# Patient Record
Sex: MALE | Race: OTHER | NOT HISPANIC OR LATINO | URBAN - METROPOLITAN AREA
[De-identification: names, ages, dates, MRNs, and addresses within clinical notes are randomized per-mention and may not be internally consistent; named-entity substitution may affect disease eponyms.]

---

## 2019-04-16 ENCOUNTER — INPATIENT (INPATIENT)
Facility: HOSPITAL | Age: 29
LOS: 1 days | Discharge: HOME | DRG: 917 | End: 2019-04-18
Payer: SELF-PAY

## 2019-04-16 VITALS — SYSTOLIC BLOOD PRESSURE: 104 MMHG

## 2019-04-16 LAB — PCP SPEC-MCNC: SIGNIFICANT CHANGE UP

## 2019-04-16 PROCEDURE — 93010 ELECTROCARDIOGRAM REPORT: CPT

## 2019-04-16 PROCEDURE — 99291 CRITICAL CARE FIRST HOUR: CPT

## 2019-04-16 PROCEDURE — 70450 CT HEAD/BRAIN W/O DYE: CPT | Mod: 26

## 2019-04-16 PROCEDURE — 99232 SBSQ HOSP IP/OBS MODERATE 35: CPT | Mod: GC

## 2019-04-16 PROCEDURE — 71045 X-RAY EXAM CHEST 1 VIEW: CPT | Mod: 26

## 2019-04-16 RX ORDER — SODIUM CHLORIDE 9 MG/ML
1000 INJECTION INTRAMUSCULAR; INTRAVENOUS; SUBCUTANEOUS
Qty: 0 | Refills: 0 | Status: DISCONTINUED | OUTPATIENT
Start: 2019-04-16 | End: 2019-04-18

## 2019-04-16 RX ORDER — NALOXONE HYDROCHLORIDE 4 MG/.1ML
0.2 SPRAY NASAL ONCE
Qty: 0 | Refills: 0 | Status: COMPLETED | OUTPATIENT
Start: 2019-04-16 | End: 2019-04-16

## 2019-04-16 RX ORDER — NALOXONE HYDROCHLORIDE 4 MG/.1ML
0.4 SPRAY NASAL ONCE
Qty: 0 | Refills: 0 | Status: COMPLETED | OUTPATIENT
Start: 2019-04-16 | End: 2019-04-16

## 2019-04-16 RX ORDER — SODIUM CHLORIDE 9 MG/ML
500 INJECTION INTRAMUSCULAR; INTRAVENOUS; SUBCUTANEOUS ONCE
Qty: 0 | Refills: 0 | Status: COMPLETED | OUTPATIENT
Start: 2019-04-16 | End: 2019-04-16

## 2019-04-16 RX ORDER — SODIUM CHLORIDE 9 MG/ML
1000 INJECTION INTRAMUSCULAR; INTRAVENOUS; SUBCUTANEOUS ONCE
Qty: 0 | Refills: 0 | Status: COMPLETED | OUTPATIENT
Start: 2019-04-16 | End: 2019-04-16

## 2019-04-16 RX ADMIN — NALOXONE HYDROCHLORIDE 0.4 MILLIGRAM(S): 4 SPRAY NASAL at 16:27

## 2019-04-16 RX ADMIN — SODIUM CHLORIDE 1000 MILLILITER(S): 9 INJECTION INTRAMUSCULAR; INTRAVENOUS; SUBCUTANEOUS at 15:29

## 2019-04-16 RX ADMIN — SODIUM CHLORIDE 1000 MILLILITER(S): 9 INJECTION INTRAMUSCULAR; INTRAVENOUS; SUBCUTANEOUS at 16:26

## 2019-04-16 RX ADMIN — SODIUM CHLORIDE 500 MILLILITER(S): 9 INJECTION INTRAMUSCULAR; INTRAVENOUS; SUBCUTANEOUS at 22:12

## 2019-04-16 RX ADMIN — NALOXONE HYDROCHLORIDE 0.2 MILLIGRAM(S): 4 SPRAY NASAL at 22:22

## 2019-04-16 RX ADMIN — SODIUM CHLORIDE 1000 MILLILITER(S): 9 INJECTION INTRAMUSCULAR; INTRAVENOUS; SUBCUTANEOUS at 20:30

## 2019-04-16 RX ADMIN — NALOXONE HYDROCHLORIDE 0.2 MILLIGRAM(S): 4 SPRAY NASAL at 22:17

## 2019-04-16 NOTE — ED ADULT NURSE NOTE - OBJECTIVE STATEMENT
Pt BIBA for suspected heroin overdose. Per ems, his sister called 911. Pt has history of IVDA. Pt given narcan in the field, now responding to painful stimuli. Pt upgraded to Dr Calabrese. . Abrasion noted to bridge of nose. Pt BIBA for suspected heroin overdose. Per ems, his sister called 911. Pt has history of IVDA. Pt given 4 mg of narcan in the field, now responding to painful stimuli. Pt upgraded to Dr Calabrese. . Abrasion noted to bridge of nose.

## 2019-04-16 NOTE — CONSULT NOTE ADULT - ATTENDING COMMENTS
plysubstance abuse, obesity, desaturation which responded to narcan.  although he is much improved in alertnetess, he did receive narcan dose 30 min ago, therefore will monitor on medical stepdown . also monitor for withdrawal.  bld cx to be done given elevated wbc.

## 2019-04-16 NOTE — ED ADULT NURSE NOTE - NSIMPLEMENTINTERV_GEN_ALL_ED
Implemented All Fall Risk Interventions:  Miranda to call system. Call bell, personal items and telephone within reach. Instruct patient to call for assistance. Room bathroom lighting operational. Non-slip footwear when patient is off stretcher. Physically safe environment: no spills, clutter or unnecessary equipment. Stretcher in lowest position, wheels locked, appropriate side rails in place. Provide visual cue, wrist band, yellow gown, etc. Monitor gait and stability. Monitor for mental status changes and reorient to person, place, and time. Review medications for side effects contributing to fall risk. Reinforce activity limits and safety measures with patient and family.

## 2019-04-16 NOTE — CONSULT NOTE ADULT - ASSESSMENT
28M BIBEMS for heroin overdose, s/p 3 doses of Narcan. Patient still sleepy and occasionally desaturating despite using heroin more than 8 hours ago.   #Heroin overdose  - Dose Narcan PRN  - Would start maintenance fluids  - Dispo: [facility]

## 2019-04-16 NOTE — ED ADULT TRIAGE NOTE - OTHER COMPLAINTS
per ems, pt known to take heroine. pt minimally responsive. found to be 44% ra on scene. placed on 100% nrb. fs 130

## 2019-04-16 NOTE — ED PROVIDER NOTE - PROGRESS NOTE DETAILS
Breed - Pt still somnolent, pinpoint pupils. Arouses to sternal rub. Given hx, pt should be more awake at this point. CT head ordered. MICU consulted given pt may require recurrent narcan and should have metabolized heroin by this time. [facility] - MICU at bedside, awaiting CT head results & Utox. Pt still clinically affected hours after expected duration, thus concern for long-acting opiate. Pt w/ occasional desat 89%, very somnolent but responds to strong verbal stimuli [facility] - Pt w/ occasional desat 89%, very somnolent but responds to strong verbal stimuli. Rpt narcan given. MICU updated, pending recs. [facility] - Discussed w/ MICU again, accepted to their service for somnolence, occasional desat, concern long adcting opiate OD [facility] - Discussed w/ MICU again, accepted to their service for somnolence, occasional desat, concern long adcting opiate OD, consider clonidine OD

## 2019-04-16 NOTE — ED PROVIDER NOTE - OBJECTIVE STATEMENT
27 yo M, heroin abuser, per EMS, brought in by EMS for heroin OD. Call from patients sister, patient found on his knees over a toilet, track marks, needs in the room. Patient with recent heroin consumption. GIven 4mg narcan total in field. Patient responsive. 29 yo M, heroin abuser, per EMS, brought in by EMS for heroin OD. Call from patients sister, patient found on his knees over a toilet, track marks, needs in the room. Patient with recent heroin consumption. GIven 4mg narcan total in field. Patient responsive.  EMS reports initial sat 44% upon arrival.

## 2019-04-16 NOTE — CONSULT NOTE ADULT - SUBJECTIVE AND OBJECTIVE BOX
ICU CONSULT NOTE    HPI:    ROS:    PMH: None    PSH: None    SOCIAL HISTORY: Uses heroin, otherwise denies other drug use despite continued assurance that this information would not be used against him in anyway.    ALLERGIES: NKDA      VITAL SIGNS:  T(C): --  T(F): --  HR: 68 (04-16-19 @ 22:14) (68 - 84)  BP: 122/87 (04-16-19 @ 22:14) (81/53 - 122/87)  BP(mean): --  RR: 18 (04-16-19 @ 22:14) (18 - 22)  SpO2: 93% (04-16-19 @ 22:14) (91% - 98%)  Wt(kg): --    PHYSICAL EXAM:    Constitutional: WDWN resting comfortably in bed; NAD; shivering   Head: NC/AT  Eyes: clear conjunctiva  ENT: no nasal discharge; uvula midline, no oropharyngeal erythema or exudates; MMM  Neck: supple; no JVD or thyromegaly  Respiratory: CTA B/L; no W/R/R, no retractions  Cardiac: +S1/S2; RRR; no M/R/G; PMI non-displaced  Gastrointestinal: soft, NT/ND; no rebound or guarding; +BSx4  Extremities: WWP, no clubbing or cyanosis; no peripheral edema  Musculoskeletal: NROM x4; no joint swelling, tenderness or erythema  Dermatologic: ecchymoses in left AC fossa  Lymphatic: no submandibular or cervical LAD  Neurologic: AAOx3; CNII-XII grossly intact; no focal deficits  Psychiatric: affect and characteristics of appearance, verbalizations, behaviors are appropriate    LABS:                         13.2   19.71 )-----------( 183      ( 16 Apr 2019 15:47 )             42.3     04-16    143  |  103  |  11  ----------------------------<  87  4.8   |  26  |  1.35<H>    Ca    8.8      16 Apr 2019 15:47    TPro  8.1  /  Alb  4.3  /  TBili  0.6  /  DBili  x   /  AST  33  /  ALT  57<H>  /  AlkPhos  122<H>  04-16    RADIOLOGY, EKG & ADDITIONAL TESTS:   CXR: clear lungs  EKG: ICU CONSULT NOTE    HPI:  Mr Ramirez is a 28 year old man with medical history of substance abuse who was BIBEMS after using heroin. He was found by his sister who called EMS. He was found to be saturating at 44% on RA by EMS, was placed on NRB, and given a dose of Narcan. The patient does not remember the vents leading up to his arrival to the ED. He does know that he used heroin today, but denies any other drugs. He denies ever overdosing on heroin, or any other drug before, and is not sure is he vomits while on heroin. He is not sure if the heroin he used was mixed with any other substances. He reports feeling "crappy" overall, but has not particular symptoms aside from headache and being cold.   Upon arrival to the ED: HR: 83, BP 92/53, R 20, SpO2 96%RA. He was given 3 doses of Narcan and 1.5L NS.     ROS: Denies fevers, chest pain, coughing, nausea, vomiting, diarrhea, dysuria, abscesses, or rash. No other pertinent positive elements.     PMH: Heroin abuse    PSH: Patient denies ever having any surgery    FMH: No pertinent positive elements    SOCIAL HISTORY: Uses heroin, otherwise denies other drug use    ALLERGIES: NKDA    VITAL SIGNS:  T(C): --  T(F): --  HR: 68 (04-16-19 @ 22:14) (68 - 84)  BP: 122/87 (04-16-19 @ 22:14) (81/53 - 122/87)  BP(mean): --  RR: 18 (04-16-19 @ 22:14) (18 - 22)  SpO2: 93% (04-16-19 @ 22:14) (91% - 98%)  Wt(kg): --    PHYSICAL EXAM:    Constitutional: Obese, sleeping on the stretcher; NAD; shivering; easily wakes to voice    Head: NC/AT  Eyes: clear conjunctiva  ENT: no nasal discharge; uvula midline, no oropharyngeal erythema or exudates; MMM  Neck: supple; no JVD or thyromegaly  Respiratory: CTA B/L; no W/R/R, no retractions  Cardiac: +S1/S2; RRR; no M/R/G; PMI non-displaced  Gastrointestinal: soft, NT/ND; no rebound or guarding; +BSx4  Extremities: WWP, no clubbing or cyanosis; no peripheral edema  Musculoskeletal: NROM x4; no joint swelling, tenderness or erythema  Dermatologic: ecchymoses in left AC fossa  Lymphatic: no submandibular or cervical LAD  Neurologic: AAOx3; CNII-XII grossly intact; no focal deficits  Psychiatric: affect and characteristics of appearance, verbalizations, behaviors are appropriate    LABS:                         13.2   19.71 )-----------( 183      ( 16 Apr 2019 15:47 )             42.3     04-16    143  |  103  |  11  ----------------------------<  87  4.8   |  26  |  1.35<H>    Ca    8.8      16 Apr 2019 15:47    TPro  8.1  /  Alb  4.3  /  TBili  0.6  /  DBili  x   /  AST  33  /  ALT  57<H>  /  AlkPhos  122<H>  04-16    RADIOLOGY, EKG & ADDITIONAL TESTS:   CXR: clear lungs  EKG: NSR at 81, incomplete RBBB

## 2019-04-16 NOTE — ED ADULT NURSE REASSESSMENT NOTE - NS ED NURSE REASSESS COMMENT FT1
[facility]  arrived in ED to question pt. Pt unable to give any information at this time.  Nemesio Mcfadden left card for contact if anything changes with pt's condition. [phone]

## 2019-04-17 DIAGNOSIS — R63.8 OTHER SYMPTOMS AND SIGNS CONCERNING FOOD AND FLUID INTAKE: ICD-10-CM

## 2019-04-17 DIAGNOSIS — T40.1X1A POISONING BY HEROIN, ACCIDENTAL (UNINTENTIONAL), INITIAL ENCOUNTER: ICD-10-CM

## 2019-04-17 DIAGNOSIS — D72.829 ELEVATED WHITE BLOOD CELL COUNT, UNSPECIFIED: ICD-10-CM

## 2019-04-17 DIAGNOSIS — F19.10 OTHER PSYCHOACTIVE SUBSTANCE ABUSE, UNCOMPLICATED: ICD-10-CM

## 2019-04-17 DIAGNOSIS — J96.01 ACUTE RESPIRATORY FAILURE WITH HYPOXIA: ICD-10-CM

## 2019-04-17 DIAGNOSIS — Z91.89 OTHER SPECIFIED PERSONAL RISK FACTORS, NOT ELSEWHERE CLASSIFIED: ICD-10-CM

## 2019-04-17 DIAGNOSIS — B19.20 UNSPECIFIED VIRAL HEPATITIS C WITHOUT HEPATIC COMA: ICD-10-CM

## 2019-04-17 DIAGNOSIS — R79.89 OTHER SPECIFIED ABNORMAL FINDINGS OF BLOOD CHEMISTRY: ICD-10-CM

## 2019-04-17 DIAGNOSIS — Z72.0 TOBACCO USE: ICD-10-CM

## 2019-04-17 LAB
ANION GAP SERPL CALC-SCNC: 10 MMOL/L — SIGNIFICANT CHANGE UP (ref 5–17)
BASOPHILS # BLD AUTO: 0.02 K/UL — SIGNIFICANT CHANGE UP (ref 0–0.2)
BASOPHILS NFR BLD AUTO: 0.2 % — SIGNIFICANT CHANGE UP (ref 0–2)
BUN SERPL-MCNC: 14 MG/DL — SIGNIFICANT CHANGE UP (ref 7–23)
CALCIUM SERPL-MCNC: 8.3 MG/DL — LOW (ref 8.4–10.5)
CHLORIDE SERPL-SCNC: 109 MMOL/L — HIGH (ref 96–108)
CO2 SERPL-SCNC: 26 MMOL/L — SIGNIFICANT CHANGE UP (ref 22–31)
CREAT SERPL-MCNC: 0.74 MG/DL — SIGNIFICANT CHANGE UP (ref 0.5–1.3)
EOSINOPHIL # BLD AUTO: 0.04 K/UL — SIGNIFICANT CHANGE UP (ref 0–0.5)
EOSINOPHIL NFR BLD AUTO: 0.5 % — SIGNIFICANT CHANGE UP (ref 0–6)
GLUCOSE SERPL-MCNC: 76 MG/DL — SIGNIFICANT CHANGE UP (ref 70–99)
HCT VFR BLD CALC: 36.7 % — LOW (ref 39–50)
HGB BLD-MCNC: 11.3 G/DL — LOW (ref 13–17)
IMM GRANULOCYTES NFR BLD AUTO: 0.5 % — SIGNIFICANT CHANGE UP (ref 0–1.5)
LYMPHOCYTES # BLD AUTO: 1.7 K/UL — SIGNIFICANT CHANGE UP (ref 1–3.3)
LYMPHOCYTES # BLD AUTO: 20 % — SIGNIFICANT CHANGE UP (ref 13–44)
MAGNESIUM SERPL-MCNC: 2.2 MG/DL — SIGNIFICANT CHANGE UP (ref 1.6–2.6)
MCHC RBC-ENTMCNC: 27.9 PG — SIGNIFICANT CHANGE UP (ref 27–34)
MCHC RBC-ENTMCNC: 30.8 GM/DL — LOW (ref 32–36)
MCV RBC AUTO: 90.6 FL — SIGNIFICANT CHANGE UP (ref 80–100)
MONOCYTES # BLD AUTO: 1.27 K/UL — HIGH (ref 0–0.9)
MONOCYTES NFR BLD AUTO: 15 % — HIGH (ref 2–14)
NEUTROPHILS # BLD AUTO: 5.42 K/UL — SIGNIFICANT CHANGE UP (ref 1.8–7.4)
NEUTROPHILS NFR BLD AUTO: 63.8 % — SIGNIFICANT CHANGE UP (ref 43–77)
NRBC # BLD: 0 /100 WBCS — SIGNIFICANT CHANGE UP (ref 0–0)
PLATELET # BLD AUTO: 153 K/UL — SIGNIFICANT CHANGE UP (ref 150–400)
POTASSIUM SERPL-MCNC: 4.2 MMOL/L — SIGNIFICANT CHANGE UP (ref 3.5–5.3)
POTASSIUM SERPL-SCNC: 4.2 MMOL/L — SIGNIFICANT CHANGE UP (ref 3.5–5.3)
RBC # BLD: 4.05 M/UL — LOW (ref 4.2–5.8)
RBC # FLD: 15.4 % — HIGH (ref 10.3–14.5)
SODIUM SERPL-SCNC: 145 MMOL/L — SIGNIFICANT CHANGE UP (ref 135–145)
WBC # BLD: 8.28 K/UL — SIGNIFICANT CHANGE UP (ref 3.8–10.5)
WBC # FLD AUTO: 8.28 K/UL — SIGNIFICANT CHANGE UP (ref 3.8–10.5)

## 2019-04-17 PROCEDURE — 99233 SBSQ HOSP IP/OBS HIGH 50: CPT

## 2019-04-17 PROCEDURE — 99233 SBSQ HOSP IP/OBS HIGH 50: CPT | Mod: GC

## 2019-04-17 RX ADMIN — SODIUM CHLORIDE 100 MILLILITER(S): 9 INJECTION INTRAMUSCULAR; INTRAVENOUS; SUBCUTANEOUS at 00:49

## 2019-04-17 NOTE — H&P ADULT - NSHPLABSRESULTS_GEN_ALL_CORE
13.2   19.71 )-----------( 183      ( 16 Apr 2019 15:47 )             42.3       04-16    143  |  103  |  11  ----------------------------<  87  4.8   |  26  |  1.35<H>    Ca    8.8      16 Apr 2019 15:47    TPro  8.1  /  Alb  4.3  /  TBili  0.6  /  DBili  x   /  AST  33  /  ALT  57<H>  /  AlkPhos  122<H>  04-16        < from: CT Head No Cont (04.16.19 @ 20:24) >      FINDINGS:     The size and configuration of the ventricles and sulci are within normal   limits for a person of this age. The gray-white matter differentiation is   preserved. There is no hemorrhage or mass effect. There are nonspecific   calcifications of the bilateral globus pallidi, greater on the left.    The calvarium is intact. The visualized paranasal sinuses and mastoid air   cells are clear.     IMPRESSION:     No acute intracranial abnormality.    < end of copied text >

## 2019-04-17 NOTE — PROGRESS NOTE ADULT - ASSESSMENT
28M with no PMH heroin  use, BIBEMS for heroin overdose >8 PTA now, s/p multiple doses of Narcan, admitted to [facility] for further monitoring in the setting of acute hypoxic respiratory failure and respiratory depression in setting of polysubstance abuse. This is a 28M with history of IVDU (heroin), smoker (10 pack year history), and HCV (s/p Mavyret) who was BIBEMS for heroin overdose s/p multiple doses of Narcan in the field admitted for acute hypoxic respiratory failure and respiratory depression in setting of polysubstance abuse.

## 2019-04-17 NOTE — PROGRESS NOTE ADULT - SUBJECTIVE AND OBJECTIVE BOX
NOTE INCOMPLETE***    OVERNIGHT EVENTS: Admitted overnight.    SUBJECTIVE / INTERVAL HPI: Patient seen and examined at bedside.     VITAL SIGNS:  Vital Signs Last 24 Hrs  T(C): 36.7 (17 Apr 2019 06:09), Max: 36.7 (17 Apr 2019 02:11)  T(F): 98 (17 Apr 2019 06:09), Max: 98.1 (17 Apr 2019 02:11)  HR: 72 (17 Apr 2019 05:14) (68 - 84)  BP: 136/66 (17 Apr 2019 05:14) (81/53 - 136/66)  BP(mean): 90 (17 Apr 2019 05:14) (89 - 90)  RR: 15 (17 Apr 2019 05:14) (15 - 22)  SpO2: 95% (17 Apr 2019 05:14) (91% - 98%)    PHYSICAL EXAM:    General: Well developed, well nourished, no acute distress  HEENT: NC/AT; PERRL, anicteric sclera; MMM  Neck: supple  Cardiovascular: +S1/S2, RRR, no murmurs, rubs, gallops  Respiratory: CTA B/L; no W/R/R  Gastrointestinal: soft, NT/ND; +BSx4  Extremities: WWP; no edema, clubbing or cyanosis  Vascular: 2+ radial, DP/PT pulses B/L  Neurological: AAOx3; no focal deficits    MEDICATIONS:  MEDICATIONS  (STANDING):  sodium chloride 0.9%. 1000 milliLiter(s) (100 mL/Hr) IV Continuous <Continuous>    MEDICATIONS  (PRN):      ALLERGIES:  Allergies    No Known Allergies    Intolerances        LABS:                        13.2   19.71 )-----------( 183      ( 16 Apr 2019 15:47 )             42.3     04-16    143  |  103  |  11  ----------------------------<  87  4.8   |  26  |  1.35<H>    Ca    8.8      16 Apr 2019 15:47    TPro  8.1  /  Alb  4.3  /  TBili  0.6  /  DBili  x   /  AST  33  /  ALT  57<H>  /  AlkPhos  122<H>  04-16    RADIOLOGY & ADDITIONAL TESTS: Reviewed. OVERNIGHT EVENTS: Admitted overnight.    SUBJECTIVE / INTERVAL HPI: Patient seen and examined at bedside. Very sleepy with loud audible snoring. Awakens to voice but falls asleep quickly. Denies CP, SOB, HA, N/V, lightheadedness. Feels tired.     VITAL SIGNS:  Vital Signs Last 24 Hrs  T(C): 36.7 (17 Apr 2019 06:09), Max: 36.7 (17 Apr 2019 02:11)  T(F): 98 (17 Apr 2019 06:09), Max: 98.1 (17 Apr 2019 02:11)  HR: 72 (17 Apr 2019 05:14) (68 - 84)  BP: 136/66 (17 Apr 2019 05:14) (81/53 - 136/66)  BP(mean): 90 (17 Apr 2019 05:14) (89 - 90)  RR: 15 (17 Apr 2019 05:14) (15 - 22)  SpO2: 95% (17 Apr 2019 05:14) (91% - 98%)    PHYSICAL EXAM:    General: Obese man in no acute distress, well nourished, arouses to voice, interacts but falls asleep quickly  HEENT: PERRL, anicteric sclera; MMM  Neck: supple, no JVD, thick neck  Cardiovascular: +S1/S2, RRR, no murmurs, rubs, gallops  Respiratory: CTA B/L; no W/R/R  Gastrointestinal: soft, NT/ND; +BSx4  Extremities: WWP; no edema, clubbing or cyanosis  Vascular: 2+ radial and pedal pulses  Neurological: AAOx3; no focal deficits    MEDICATIONS:  MEDICATIONS  (STANDING):  sodium chloride 0.9%. 1000 milliLiter(s) (100 mL/Hr) IV Continuous <Continuous>    MEDICATIONS  (PRN):    ALLERGIES:  Allergies    No Known Allergies    LABS:                        13.2   19.71 )-----------( 183      ( 16 Apr 2019 15:47 )             42.3     04-16    143  |  103  |  11  ----------------------------<  87  4.8   |  26  |  1.35<H>    Ca    8.8      16 Apr 2019 15:47    TPro  8.1  /  Alb  4.3  /  TBili  0.6  /  DBili  x   /  AST  33  /  ALT  57<H>  /  AlkPhos  122<H>  04-16    RADIOLOGY & ADDITIONAL TESTS: Reviewed. OVERNIGHT EVENTS: RONI    SUBJECTIVE / INTERVAL HPI: Patient seen and examined at bedside. Very sleepy with loud audible snoring. Awakens to voice but falls asleep quickly. Denies CP, SOB, HA, N/V, lightheadedness. Feels tired.     VITAL SIGNS:  Vital Signs Last 24 Hrs  T(C): 36.7 (17 Apr 2019 06:09), Max: 36.7 (17 Apr 2019 02:11)  T(F): 98 (17 Apr 2019 06:09), Max: 98.1 (17 Apr 2019 02:11)  HR: 72 (17 Apr 2019 05:14) (68 - 84)  BP: 136/66 (17 Apr 2019 05:14) (81/53 - 136/66)  BP(mean): 90 (17 Apr 2019 05:14) (89 - 90)  RR: 15 (17 Apr 2019 05:14) (15 - 22)  SpO2: 95% (17 Apr 2019 05:14) (91% - 98%)    PHYSICAL EXAM:    General: Obese man in no acute distress, well nourished, arouses to voice, interacts but falls asleep quickly  HEENT: PERRL, anicteric sclera; MMM  Neck: supple, no JVD, thick neck  Cardiovascular: +S1/S2, RRR, no murmurs, rubs, gallops  Respiratory: CTA B/L; no W/R/R  Gastrointestinal: soft, NT/ND; +BSx4  Extremities: WWP; no edema, clubbing or cyanosis  Vascular: 2+ radial and pedal pulses  Neurological: AAOx3; no focal deficits    MEDICATIONS:  MEDICATIONS  (STANDING):  sodium chloride 0.9%. 1000 milliLiter(s) (100 mL/Hr) IV Continuous <Continuous>    MEDICATIONS  (PRN):    ALLERGIES:  Allergies    No Known Allergies    LABS:                        13.2   19.71 )-----------( 183      ( 16 Apr 2019 15:47 )             42.3     04-16    143  |  103  |  11  ----------------------------<  87  4.8   |  26  |  1.35<H>    Ca    8.8      16 Apr 2019 15:47    TPro  8.1  /  Alb  4.3  /  TBili  0.6  /  DBili  x   /  AST  33  /  ALT  57<H>  /  AlkPhos  122<H>  04-16    RADIOLOGY & ADDITIONAL TESTS: Reviewed. HOSPITAL COURSE    This is a 28M with history of IVDU (heroin), smoker (10 pack year history), and HCV (s/p Mavyret) who was BIBEMS for heroin overdose s/p multiple doses of Narcan in the field admitted for acute hypoxic respiratory failure and respiratory depression in setting of polysubstance abuse. Patient received 0.8mg additional Narcan in ED and 1.5L NS and was admitted for monitoring. UTox positive for opioids, THC, cocaine, and amphetamines. Admission reactive leukocytosis and KAREEN resolved with fluids. Patient was hemodynamically stable without hypoxia.    OVERNIGHT EVENTS: RONI    SUBJECTIVE / INTERVAL HPI: Patient seen and examined at bedside. Very sleepy with loud audible snoring. Awakens to voice but falls asleep quickly. Denies CP, SOB, HA, N/V, lightheadedness. Feels tired.     VITAL SIGNS:  Vital Signs Last 24 Hrs  T(C): 36.7 (17 Apr 2019 06:09), Max: 36.7 (17 Apr 2019 02:11)  T(F): 98 (17 Apr 2019 06:09), Max: 98.1 (17 Apr 2019 02:11)  HR: 72 (17 Apr 2019 05:14) (68 - 84)  BP: 136/66 (17 Apr 2019 05:14) (81/53 - 136/66)  BP(mean): 90 (17 Apr 2019 05:14) (89 - 90)  RR: 15 (17 Apr 2019 05:14) (15 - 22)  SpO2: 95% (17 Apr 2019 05:14) (91% - 98%)    PHYSICAL EXAM:    General: Obese man in no acute distress, well nourished, arouses to voice, interacts but falls asleep quickly  HEENT: PERRL, anicteric sclera; MMM  Neck: supple, no JVD, thick neck  Cardiovascular: +S1/S2, RRR, no murmurs, rubs, gallops  Respiratory: CTA B/L; no W/R/R  Gastrointestinal: soft, NT/ND; +BSx4  Extremities: WWP; no edema, clubbing or cyanosis  Vascular: 2+ radial and pedal pulses  Neurological: AAOx3; no focal deficits    MEDICATIONS:  MEDICATIONS  (STANDING):  sodium chloride 0.9%. 1000 milliLiter(s) (100 mL/Hr) IV Continuous <Continuous>    MEDICATIONS  (PRN):    ALLERGIES:  Allergies    No Known Allergies    LABS:                        13.2   19.71 )-----------( 183      ( 16 Apr 2019 15:47 )             42.3     04-16    143  |  103  |  11  ----------------------------<  87  4.8   |  26  |  1.35<H>    Ca    8.8      16 Apr 2019 15:47    TPro  8.1  /  Alb  4.3  /  TBili  0.6  /  DBili  x   /  AST  33  /  ALT  57<H>  /  AlkPhos  122<H>  04-16    RADIOLOGY & ADDITIONAL TESTS: Reviewed. HOSPITAL COURSE    This is a 28M with history of IVDU (heroin), smoker (10 pack year history), and HCV (s/p Mavyret) who was BIBEMS for heroin overdose s/p multiple doses of Narcan in the field admitted for acute hypoxic respiratory failure and respiratory depression in setting of polysubstance abuse. Patient received 0.8mg additional Narcan in ED and 1.5L NS and was admitted for monitoring. UTox positive for opioids, THC, cocaine, and amphetamines. Admission reactive leukocytosis and KAREEN resolved with fluids. Patient was hemodynamically stable without hypoxia, was started on BIPAP at night for suspected SO, and medically ready for step down to [facility].    OVERNIGHT EVENTS: RONI    SUBJECTIVE / INTERVAL HPI: Patient seen and examined at bedside. Very sleepy with loud audible snoring. Awakens to voice but falls asleep quickly. Denies CP, SOB, HA, N/V, lightheadedness. Feels tired.     VITAL SIGNS:  Vital Signs Last 24 Hrs  T(C): 36.7 (17 Apr 2019 06:09), Max: 36.7 (17 Apr 2019 02:11)  T(F): 98 (17 Apr 2019 06:09), Max: 98.1 (17 Apr 2019 02:11)  HR: 72 (17 Apr 2019 05:14) (68 - 84)  BP: 136/66 (17 Apr 2019 05:14) (81/53 - 136/66)  BP(mean): 90 (17 Apr 2019 05:14) (89 - 90)  RR: 15 (17 Apr 2019 05:14) (15 - 22)  SpO2: 95% (17 Apr 2019 05:14) (91% - 98%)    PHYSICAL EXAM:    General: Obese man in no acute distress, well nourished, arouses to voice, interacts but falls asleep quickly  HEENT: PERRL, anicteric sclera; MMM  Neck: supple, no JVD, thick neck  Cardiovascular: +S1/S2, RRR, no murmurs, rubs, gallops  Respiratory: CTA B/L; no W/R/R  Gastrointestinal: soft, NT/ND; +BSx4  Extremities: WWP; no edema, clubbing or cyanosis  Vascular: 2+ radial and pedal pulses  Neurological: AAOx3; no focal deficits    MEDICATIONS:  MEDICATIONS  (STANDING):  sodium chloride 0.9%. 1000 milliLiter(s) (100 mL/Hr) IV Continuous <Continuous>    MEDICATIONS  (PRN):    ALLERGIES:  Allergies    No Known Allergies    LABS:                        13.2   19.71 )-----------( 183      ( 16 Apr 2019 15:47 )             42.3     04-16    143  |  103  |  11  ----------------------------<  87  4.8   |  26  |  1.35<H>    Ca    8.8      16 Apr 2019 15:47    TPro  8.1  /  Alb  4.3  /  TBili  0.6  /  DBili  x   /  AST  33  /  ALT  57<H>  /  AlkPhos  122<H>  04-16    RADIOLOGY & ADDITIONAL TESTS: Reviewed. TRANSFER NOTE [facility] TO [facility]    HOSPITAL COURSE    This is a 28M with history of IVDU (heroin), smoker (10 pack year history), and HCV (s/p Mavyret) who was BIBEMS for heroin overdose s/p multiple doses of Narcan in the field admitted for acute hypoxic respiratory failure and respiratory depression in setting of polysubstance abuse. Patient received 0.8mg additional Narcan in ED and 1.5L NS and was admitted for monitoring. UTox positive for opioids, THC, cocaine, and amphetamines. Admission reactive leukocytosis and KAREEN resolved with fluids. Patient was hemodynamically stable without hypoxia, was started on BIPAP at night for suspected SO, and medically ready for step down to [facility].    OVERNIGHT EVENTS: RONI    SUBJECTIVE / INTERVAL HPI: Patient seen and examined at bedside. Very sleepy with loud audible snoring. Awakens to voice but falls asleep quickly. Denies CP, SOB, HA, N/V, lightheadedness. Feels tired.     VITAL SIGNS:  Vital Signs Last 24 Hrs  T(C): 36.7 (17 Apr 2019 06:09), Max: 36.7 (17 Apr 2019 02:11)  T(F): 98 (17 Apr 2019 06:09), Max: 98.1 (17 Apr 2019 02:11)  HR: 72 (17 Apr 2019 05:14) (68 - 84)  BP: 136/66 (17 Apr 2019 05:14) (81/53 - 136/66)  BP(mean): 90 (17 Apr 2019 05:14) (89 - 90)  RR: 15 (17 Apr 2019 05:14) (15 - 22)  SpO2: 95% (17 Apr 2019 05:14) (91% - 98%)    PHYSICAL EXAM:    General: Obese man in no acute distress, well nourished, arouses to voice, interacts but falls asleep quickly  HEENT: PERRL, anicteric sclera; MMM  Neck: supple, no JVD, thick neck  Cardiovascular: +S1/S2, RRR, no murmurs, rubs, gallops  Respiratory: CTA B/L; no W/R/R  Gastrointestinal: soft, NT/ND; +BSx4  Extremities: WWP; no edema, clubbing or cyanosis  Vascular: 2+ radial and pedal pulses  Neurological: AAOx3; no focal deficits    MEDICATIONS:  MEDICATIONS  (STANDING):  sodium chloride 0.9%. 1000 milliLiter(s) (100 mL/Hr) IV Continuous <Continuous>    MEDICATIONS  (PRN):    ALLERGIES:  Allergies    No Known Allergies    LABS:                        13.2   19.71 )-----------( 183      ( 16 Apr 2019 15:47 )             42.3     04-16    143  |  103  |  11  ----------------------------<  87  4.8   |  26  |  1.35<H>    Ca    8.8      16 Apr 2019 15:47    TPro  8.1  /  Alb  4.3  /  TBili  0.6  /  DBili  x   /  AST  33  /  ALT  57<H>  /  AlkPhos  122<H>  04-16    RADIOLOGY & ADDITIONAL TESTS: Reviewed.

## 2019-04-17 NOTE — PROGRESS NOTE ADULT - ASSESSMENT
This is a 28M with history of IVDU (heroin), smoker (10 pack year history), and HCV (s/p Mavyret) with significant psychiatric hx (bipolar, depression, anxiety) who was BIBEMS for heroin overdose s/p multiple doses of Narcan in the field admitted for acute hypoxic respiratory failure and respiratory depression in setting of polysubstance abuse.

## 2019-04-17 NOTE — PROGRESS NOTE ADULT - PROBLEM SELECTOR PLAN 2
Pt was found hypoxic with sat 44% at the scene by EMS, likely due to respiratory depression from opiate overdose. Was placed on non-rebreather at 100%. Now clinically improving on 4L NC. However, pt is also morbidly obese, and has been told he needs to be worked up for SO, but has not had a sleep study   - Pulm follow up for official sleep study - Hypoxia to 44% at the scene by EMS, likely due to respiratory depression from opiate overdose. Was placed on non-rebreather at 100%  - Clinical improvement with 3L NC  - Also suspected component of SO given body habitus and observed desaturation and snoring when asleep  - Pulm follow up for official sleep study - Hypoxia to 44% at the scene by EMS, likely due to respiratory depression from opiate overdose and polysubstance use. Was placed on non-rebreather at 100%  - Clinical improvement with 3L NC  - Also suspected component of SO given body habitus and observed desaturation and snoring when asleep  - Set up with pulm for follow up for official sleep study. Ordered for BiPAP overnight while inpatient

## 2019-04-17 NOTE — PROGRESS NOTE ADULT - PROBLEM SELECTOR PLAN 3
- As above, counseling given on polysubstance use cessation    #Psychiatric disorder  - pt w hx of bipolar, depression and anxiety. pt currently on disability due to these dx. unable to tell us about outpt psychiatrist at this time  - obtain collateral from psychiatrist.  - complete med rec - especially w psychiatric medications. pt unable to name his medications at this time.

## 2019-04-17 NOTE — H&P ADULT - PROBLEM SELECTOR PLAN 7
1) PCP Contacted on Admission: (Y/N) --> Name & Phone #: No document PCP  2) Date of Contact with PCP:  3) PCP Contacted at Discharge: (Y/N, N/A)  4) Summary of Handoff Given to PCP:   5) Post-Discharge Appointment Date and Location: F: IVF /hr   E: Replete PRN  N: NPO, bedside dysphagia when more awake F: IVF /hr   E: Replete PRN  N: NPO, bedside dysphagia when more awake  DVT: SCDs  Code: Full Code  Dispo: [facility] Smokes 1/2 ppd x the past 20 years  - offer nicotine patch in the AM  - tobacco cessation counseling

## 2019-04-17 NOTE — PROGRESS NOTE ADULT - PROBLEM SELECTOR PLAN 1
- Heroin overdose with admitted heroin injection on day of admission with lethargy and hypoxia. s/p 3x Narcan in field and 0.8mg narcan in ED here. pt was counseled on heroin cessation.  - Utox with opioids, THC, cocaine, amphetamines  - CT head negative, EKG with NSR and incomplete RBBB  - fall, aspiration precautions  - Continue NS at 100cc/hr

## 2019-04-17 NOTE — H&P ADULT - PROBLEM SELECTOR PROBLEM 7
Transition of care performed with sharing of clinical summary Nutrition, metabolism, and development symptoms Tobacco abuse

## 2019-04-17 NOTE — DISCHARGE NOTE PROVIDER - NSDCCPCAREPLAN_GEN_ALL_CORE_FT
PRINCIPAL DISCHARGE DIAGNOSIS  Diagnosis: Altered mental status, unspecified altered mental status type  Assessment and Plan of Treatment: You were admitted to the hospital due to heroine overdose. You were given several doses of narcan and monitored since you were very sedated and at times your oxygen levels were very low. It is very important that you stop using such substances.      SECONDARY DISCHARGE DIAGNOSES  Diagnosis: Risk factors for obstructive sleep apnea  Assessment and Plan of Treatment: We believe that you likely have sleep apnea. This is dangerous because it causes an obstruction in your airway and decreases the oxygen levels in your body. This likely contributed to your low oxygen levels when you were found at your friends house. Please follow up with your primary care doctor about obtaining a sleep study and CPAP machine.

## 2019-04-17 NOTE — H&P ADULT - ASSESSMENT
28M with no PMH heroin  use, BIBEMS for heroin overdose >8 PTA now, s/p multiple doses of Narcan, admitted to [facility] for further monitoring in the setting of acute hypoxic respiratory failure and respiratory depression in setting of polysubstance abuse.

## 2019-04-17 NOTE — PROGRESS NOTE ADULT - PROBLEM SELECTOR PLAN 4
- Resolved. Transient leukocytosis with 9.6% bandemia likely reactive 2/2 polysubstance use. CXR clear, no localizing s/s infection - Resolved. Transient leukocytosis with 9.6% bandemia likely reactive 2/2 polysubstance use. CXR clear, no localizing s/s infection    #Morbid obesity  - consider nutrition consult  - set up w PCP for monitoring of weight, a1c, lipids, TSH

## 2019-04-17 NOTE — PROGRESS NOTE ADULT - PROBLEM SELECTOR PLAN 9
1) PCP Contacted on Admission: (Y/N) --> Name & Phone #:  2) Date of Contact with PCP:  3) PCP Contacted at Discharge: (Y/N, N/A)  4) Summary of Handoff Given to PCP:   5) Post-Discharge Appointment Date and Location:
1) PCP Contacted on Admission: (Y/N) --> Name & Phone #:  2) Date of Contact with PCP:  3) PCP Contacted at Discharge: (Y/N, N/A)  4) Summary of Handoff Given to PCP:   5) Post-Discharge Appointment Date and Location:

## 2019-04-17 NOTE — PROGRESS NOTE ADULT - PROBLEM SELECTOR PLAN 1
Pt presented with heroin overdose (injected unknown quantity), pt found to be very lethargic, minimally responsive, hypoxic.  S/p 3 doses Narcan in ED, with mental status improving, still tired but arousable, protecting his airway, no evidence of resp distress, hemodynamically stable. CT head negative. Acetaminophen, salicylate and alcohol levels negative. Utox with THC, cocaine, amphetamines, and opiates,   - monitor for withdrawal  - continue supportive care   - fall, aspiration precautions - Heroin overdose with admitted heroin injection on day of admission with lethargy and hypoxia. s/p 3x Narcan in field and 0.8mg narcan in ED here  - Utox with opioids, THC, cocaine, amphetamines  - CT head negative, EKG with NSR  - fall, aspiration precautions  - Continue NS at 100cc/hr - Heroin overdose with admitted heroin injection on day of admission with lethargy and hypoxia. s/p 3x Narcan in field and 0.8mg narcan in ED here  - Utox with opioids, THC, cocaine, amphetamines  - CT head negative, EKG with NSR and incomplete RBBB  - fall, aspiration precautions  - Continue NS at 100cc/hr

## 2019-04-17 NOTE — PROGRESS NOTE ADULT - PROBLEM SELECTOR PLAN 2
- Hypoxia to 44% at the scene by EMS, likely due to respiratory depression from opiate overdose and polysubstance use. Was placed on non-rebreather at 100%  - Currently saturating well on RA  - Also suspected component of SO given body habitus and observed desaturation and snoring when asleep  - outpt sleep study

## 2019-04-17 NOTE — PROGRESS NOTE ADULT - PROBLEM SELECTOR PLAN 3
UTox with THC, amphetamine, cocaine, and opiate. Pt with history of IVDA. EKG with NSR HR 81, incomplete RBBB,  ms  - counseling on polysubstance use cessation - As above, counseling given on polysubstance use cessation

## 2019-04-17 NOTE — DISCHARGE NOTE PROVIDER - HOSPITAL COURSE
This is a 28M with history of IVDU (heroin), smoker (10 pack year history), and HCV (s/p Mavyret) who was BIBEMS for heroin overdose s/p multiple doses of Narcan in the field admitted for acute hypoxic respiratory failure and respiratory depression in setting of polysubstance abuse. Patient received 0.8mg additional Narcan in ED and 1.5L NS and was admitted for monitoring. UTox positive for opioids, THC, cocaine, and amphetamines. Admission reactive leukocytosis and KAREEN resolved with fluids. Patient was hemodynamically stable without hypoxia, was started on BIPAP at night for suspected SO, and was set up with outpatient pulmonology for official sleep study.

## 2019-04-17 NOTE — H&P ADULT - HISTORY OF PRESENT ILLNESS
28M with [facility] substance abuse who was BIBEMS after using heroin. He was found by his sister who called EMS. He was found to be saturating at 44% on RA by EMS, was placed on NRB, and given a dose of Narcan. The patient does not remember the vents leading up to his arrival to the ED. He does know that he used heroin today, but denies any other drugs. He denies ever overdosing on heroin, or any other drug before, and is not sure is he vomits while on heroin. He is not sure if the heroin he used was mixed with any other substances. He reports feeling "crappy" overall, but has not particular symptoms aside from headache and being cold.   Upon arrival to the ED: HR: 83, BP 92/53, R 20, SpO2 96%RA. He was given 3 doses of Narcan and 1.5L NS. 28M with [facility] substance abuse who was BIBEMS after using heroin. He was found by his sister who called EMS. He was found to be saturating at 44% on RA by EMS, was placed on NRB, and given a dose of Narcan. The patient does not remember the events leading up to his arrival to the ED. He does know that he used heroin today, but denies any other drugs. He denies ever overdosing on heroin, or any other drug before, and is not sure if he vomits while on heroin. He is not sure if the heroin he used was mixed with any other substances. He reports feeling "crappy" overall, but has not particular symptoms aside from headache and being cold.   Upon arrival to the ED: HR: 83, BP 92/53, R 20, SpO2 96%RA. He was given 3 doses of Narcan and 1.5L NS. 28M with [facility] substance abuse (heroin, THC), current smoker (1/2 ppd x 20 years), HCV treated [facility], who was BIBEMS after using heroin. He was found by his sister who called EMS. He was found to be saturating at 44% on RA by EMS, was placed on NRB, and given a dose of Narcan. The patient does not remember the events leading up to his arrival to the ED. He does know that he used heroin today, but denies any other drugs. He denies ever overdosing on heroin, or any other drug before, and is not sure if he vomits while on heroin. He is not sure if the heroin he used was mixed with any other substances. He reports feeling "crappy" overall, but has not particular symptoms aside from headache and being cold.   Upon arrival to the ED: HR: 83, BP 92/53, R 20, SpO2 96%RA. He was given 3 doses of Narcan and 1.5L NS. 28M with [facility] substance abuse (heroin, THC), current smoker (1/2 ppd x 20 years), HCV treated with Mavyret, who was BIBEMS after using heroin. He was found by his sister who called EMS. He was found to be saturating at 44% on RA by EMS, was placed on NRB, and given a dose of Narcan. The patient does not remember the events leading up to his arrival to the ED. He does know that he used heroin today, but denies any other drugs. He denies ever overdosing on heroin, or any other drug before, and is not sure if he vomits while on heroin. He is not sure if the heroin he used was mixed with any other substances. He reports feeling "crappy" overall, but has not particular symptoms aside from headache and being cold.   Upon arrival to the ED: HR: 83, BP 92/53, R 20, SpO2 96%RA. He was given 3 doses of Narcan and 1.5L NS. Labs with leukocytosis WBC 19.7 with bands of 9.7%. CXR negative for infiltrate. 28M with PMH substance abuse (heroin, THC), current smoker (1/2 ppd x 20 years), HCV treated with Mavyret, who was BIBEMS after using heroin. He was found by his sister who called EMS. He was found to be saturating at 44% on RA by EMS, was placed on NRB, and given a dose of Narcan. The patient does not remember the events leading up to his arrival to the ED. He does know that he used heroin today, but denies any other drugs. He denies ever overdosing on heroin, or any other drug before, and is not sure if he vomits while on heroin. He is not sure if the heroin he used was mixed with any other substances. He reports feeling "crappy" overall, but has not particular symptoms aside from headache and being cold.   Upon arrival to the ED: HR: 83, BP 92/53, R 20, SpO2 96%RA. He was given 3 doses of Narcan and 1.5L NS. Labs with leukocytosis WBC 19.7 with bands of 9.7%. CXR negative for infiltrate. Pt transferred to [facility] for further monitoring.

## 2019-04-17 NOTE — H&P ADULT - PROBLEM SELECTOR PLAN 6
1) PCP Contacted on Admission: (Y/N) --> Name & Phone #:  2) Date of Contact with PCP:  3) PCP Contacted at Discharge: (Y/N, N/A)  4) Summary of Handoff Given to PCP:   5) Post-Discharge Appointment Date and Location: F: IVF /hr   E: Replete PRN  N: NPO, bedside dysphagia when more awake Pt with h/o HCV s/p treatment with Mavyret, unknown when. Pt currently somnolent, not to provide accurate history.   - continue to monitor

## 2019-04-17 NOTE — PROGRESS NOTE ADULT - PROBLEM SELECTOR PLAN 6
Pt with h/o HCV s/p treatment with Mavyret, unknown when. Pt currently somnolent, not to provide accurate history Pt with history of HCV s/p treatment with Mavyret

## 2019-04-17 NOTE — PATIENT PROFILE ADULT - FALL HARM RISK CONCLUSION
0753--fht's DOWN TO 60 X 2 MIN. TOTAL  DECEL TIME 3 MIN, TURNED TO LEFT , THEN RIGHT THEN BACK TO LEFT BEFORE FHT'S UP , DR YOON AT BEDSIDE, IVF'S AT WIDE OPEN RATE  0758--FHT'S DOWN TO 60 X 1 MIN THEN RETURNED   0759--TERBUTALINE 0.25 MG GIVEN SUB Q PER MD ORDERS  0802--FHT'S DOWN TO 70 X 20 SEC THEN BACK TO BASELINE   0824--CONTRACTION, FHT'S 125 WITH ACEL 'S WITH CONTRACTION  1415--Leandro CALLED AND ASKED ME TO CHECK PATIENT BECAUSE SHE WAS HAVING VARIABLE DECELS, PT WAS NOTED TO BE COMPLETELY DILIATED AND BREECH PRESENTATION, NADIA PATEL RN ASKED TO CHECK PATIENT, AND FREEMAN TORRES RN ALSO CHECKED , BREECH PRESENTATION CONFIRMED, LEANDRO NOTIFIED AND ON HER WAY  
Fall with Harm Risk

## 2019-04-17 NOTE — H&P ADULT - PROBLEM SELECTOR PLAN 5
F: IVF /hr   E: Replete PRN  N: NPO, bedside dysphagia when more awake Cr 1.35, no baseline for comparison, likely due to dehydration/hypovolemic. Pt initially hypotensive SBPs 80-90s which improved with fluid resuscitation,  - continue IV hydration  - trend BMP   - if persistent elevation or uptrending, would obtain urine lytes.

## 2019-04-17 NOTE — H&P ADULT - PROBLEM SELECTOR PLAN 3
UTox with THC, amphetamine, cocaine, and opiate. However pt denied any drug use. EKG with NSR HR 81, incomplete RBBB, however no previous EKG for comparison   - repeat EKG in AM  - social work consult   - counseling on polysubstance use cessation UTox with THC, amphetamine, cocaine, and opiate. However pt denied any drug use. EKG with NSR HR 81, incomplete RBBB,  ms. However no previous EKG for comparison   - repeat EKG in AM  - social work consult   - counseling on polysubstance use cessation UTox with THC, amphetamine, cocaine, and opiate. Pt with history of IVDA. However pt denied any drug use. EKG with NSR HR 81, incomplete RBBB,  ms. However no previous EKG for comparison   - repeat EKG in AM  - social work consult   - counseling on polysubstance use cessation UTox with THC, amphetamine, cocaine, and opiate. Pt with history of IVDA. EKG with NSR HR 81, incomplete RBBB,  ms. However no previous EKG for comparison   - repeat EKG in AM  - social work consult   - counseling on polysubstance use cessation

## 2019-04-17 NOTE — H&P ADULT - PROBLEM SELECTOR PLAN 4
WBC 19.7 with 9.6 % bands on arrival, likely reactive in setting of overdose, vs. aspiration PNA/chemical pneumonitis. Infectious etiology less likely, as patient is afebrile, and non-toxic appearing.  CXR negative for any infiltrate/consolidation.   - trend CBC  - f/u BCx  - continue to monitor off antibiotics. WBC 19.7 with 9.6 % bands on arrival, likely reactive in setting of overdose, vs. aspiration PNA/chemical pneumonitis. Infectious etiology less likely, as patient is afebrile, and non-toxic appearing.  CXR negative for any infiltrate/consolidation.   - trend CBC  - continue to monitor off antibiotics.

## 2019-04-17 NOTE — PROGRESS NOTE ADULT - PROBLEM SELECTOR PLAN 8
F: IVF /hr   E: Replete PRN  N: Regular diet  DVT: SCDs  Code: Full Code  Dispo: [facility] F: IVF /hr   E: Replete PRN  N: Regular diet  DVT: SCDs  Code: Full Code  Dispo: [facility]

## 2019-04-17 NOTE — PROGRESS NOTE ADULT - PROBLEM SELECTOR PLAN 8
F: IVF /hr   E: Replete PRN  N: NPO, bedside dysphagia when more awake  DVT: SCDs  Code: Full Code  Dispo: [facility]

## 2019-04-17 NOTE — H&P ADULT - ATTENDING COMMENTS
Patient seen and examined with house-staff during bedside rounds.  Resident note read, including vitals, physical findings, laboratory data, and radiological reports.   Revisions included below.  Direct personal management at bed side and extensive interpretation of the data.  Plan was outlined and discussed in details with the housestaff.  Decision making of high complexity  Action taken for acute disease activity to reflect the level of care provided:  - medication reconciliation  - review laboratory data  She was seen and examined. I discussed the case in details with the patient. Explained to the patient that she has anatomical feature consistent with obstructive sleep apnea and drug overdose or narcotics will worsen his sleep apnea. Patient will need sleep study as an outpatient. Patient will need counseling regarding drug overdose. Patient is stable for regular floor. No evidence of aspiration pneumonia. The baseline oxygen saturation is 92% on room air. Increase activity.

## 2019-04-17 NOTE — H&P ADULT - PROBLEM SELECTOR PLAN 8
1) PCP Contacted on Admission: (Y/N) --> Name & Phone #: No document PCP  2) Date of Contact with PCP:  3) PCP Contacted at Discharge: (Y/N, N/A)  4) Summary of Handoff Given to PCP:   5) Post-Discharge Appointment Date and Location: 1) PCP Contacted on Admission: (Y/N) --> Name & Phone #:  2) Date of Contact with PCP:  3) PCP Contacted at Discharge: (Y/N, N/A)  4) Summary of Handoff Given to PCP:   5) Post-Discharge Appointment Date and Location: F: IVF /hr   E: Replete PRN  N: NPO, bedside dysphagia when more awake  DVT: SCDs  Code: Full Code  Dispo: [facility]

## 2019-04-17 NOTE — PROGRESS NOTE ADULT - PROBLEM SELECTOR PLAN 5
Cr 1.35, no baseline for comparison, likely due to dehydration/hypovolemic. Pt initially hypotensive SBPs 80-90s which improved with fluid resuscitation,  - continue IV hydration - Elevated Cr to 1.35 on admission possibly in setting of hypovolemia, continue NS 100cc/hr and reassess, suspected pre-renal etiology - Resolved. Elevated Cr to 1.35 on admission, pre-renal KAREEN 2/2 hypovolemia. Downtrended after NS.  - Continue NS 100cc/hr

## 2019-04-17 NOTE — PROGRESS NOTE ADULT - PROBLEM SELECTOR PLAN 7
Smokes 1/2 ppd x the past 20 years  - offer nicotine patch in the AM  - tobacco cessation counseling Smokes 1/2 ppd x the past 20 years  - offer nicotine patch when awakens  - tobacco cessation counseling

## 2019-04-17 NOTE — PROGRESS NOTE ADULT - PROBLEM SELECTOR PLAN 7
Smokes 1/2 ppd x the past 20 years  - offer nicotine patch when awakens  - tobacco cessation counseling

## 2019-04-17 NOTE — PROGRESS NOTE ADULT - PROBLEM SELECTOR PLAN 4
WBC 19.7 with 9.6 % bands on arrival, likely reactive in setting of overdose, vs. aspiration PNA/chemical pneumonitis. Infectious etiology less likely, as patient is afebrile, and non-toxic appearing. - Leukocytosis with 9.6% bandemia likely reactive 2/2 drug overdose vs chemocal pneumonitis. CXR clear, no localizing s/s infection - Resolved. Transient leukocytosis with 9.6% bandemia likely reactive 2/2 polysubstance use. CXR clear, no localizing s/s infection

## 2019-04-17 NOTE — H&P ADULT - NSHPREVIEWOFSYSTEMS_GEN_ALL_CORE
CONSTITUTIONAL: No weakness, fevers or chills  EYES/ENT: No visual changes;  No vertigo or throat pain   RESPIRATORY: No cough, wheezing, hemoptysis; No shortness of breath  CARDIOVASCULAR: No chest pain or palpitations  GASTROINTESTINAL: No abdominal or epigastric pain. No nausea, vomiting, or hematemesis; No diarrhea or constipation. No melena or hematochezia.  GENITOURINARY: No dysuria, frequency or hematuria  NEUROLOGICAL: No numbness or weakness  SKIN: No itching, rashes

## 2019-04-17 NOTE — DISCHARGE NOTE PROVIDER - NSDCFUADDAPPT_GEN_ALL_CORE_FT
- Follow up with your primary care doctor within 1-2 weeks of discharge.  - Follow up with a pulmonologist for a sleep study for your SO. - Follow up with your primary care doctor within 1-2 weeks of discharge.

## 2019-04-17 NOTE — PROGRESS NOTE ADULT - SUBJECTIVE AND OBJECTIVE BOX
OVERNIGHT EVENTS:    SUBJECTIVE / INTERVAL HPI: Patient seen and examined at bedside.     VITAL SIGNS:  Vital Signs Last 24 Hrs  T(C): 36.8 (17 Apr 2019 10:23), Max: 36.8 (17 Apr 2019 10:23)  T(F): 98.3 (17 Apr 2019 10:23), Max: 98.3 (17 Apr 2019 10:23)  HR: 70 (17 Apr 2019 08:48) (68 - 84)  BP: 128/78 (17 Apr 2019 08:48) (81/53 - 136/66)  BP(mean): 96 (17 Apr 2019 08:48) (89 - 96)  RR: 16 (17 Apr 2019 08:48) (15 - 22)  SpO2: 95% (17 Apr 2019 08:48) (91% - 98%)    PHYSICAL EXAM:    General: WDWN  HEENT: NC/AT; PERRL, anicteric sclera; MMM  Neck: supple  Cardiovascular: +S1/S2, RRR  Respiratory: CTA B/L; no W/R/R  Gastrointestinal: soft, NT/ND; +BSx4  Extremities: WWP; no edema, clubbing or cyanosis  Vascular: 2+ radial, DP/PT pulses B/L  Neurological: AAOx3; no focal deficits    MEDICATIONS:  MEDICATIONS  (STANDING):  sodium chloride 0.9%. 1000 milliLiter(s) (100 mL/Hr) IV Continuous <Continuous>    MEDICATIONS  (PRN):      ALLERGIES:  Allergies    No Known Allergies    Intolerances        LABS:                        11.3   8.28  )-----------( 153      ( 17 Apr 2019 07:21 )             36.7     04-17    145  |  109<H>  |  14  ----------------------------<  76  4.2   |  26  |  0.74    Ca    8.3<L>      17 Apr 2019 07:21  Mg     2.2     04-17    TPro  8.1  /  Alb  4.3  /  TBili  0.6  /  DBili  x   /  AST  33  /  ALT  57<H>  /  AlkPhos  122<H>  04-16        CAPILLARY BLOOD GLUCOSE          RADIOLOGY & ADDITIONAL TESTS: Reviewed. TRANSFER ACCEPTANCE NOTE: [facility] to [address] [place] [address] Course:  This is a 28M with history of IVDU (heroin), smoker (10 pack year history), and HCV (s/p Mavyret) who was BIBEMS for heroin overdose s/p multiple doses of Narcan in the field admitted for acute hypoxic respiratory failure and respiratory depression in setting of polysubstance abuse. Patient received 0.8mg additional Narcan in ED and 1.5L NS and was admitted for monitoring. UTox positive for opioids, THC, cocaine, and amphetamines. Admission reactive leukocytosis and KAREEN resolved with fluids. Patient was hemodynamically stable without hypoxia, was started on BIPAP at night for suspected SO, and medically ready for step down to [facility].    SUBJECTIVE / INTERVAL HPI: Patient seen and examined at bedside. pt without complaint. eager to go home. states he has intermittent nausea but otherwise denies vomiting, headache, dizziness, blurry vision, SOB, chest pain, cough, constipation, diarrhea, dysuria or pain. Denies auditory, visual or tactile hallucinations. States he still feels sleepy but is otherwise back to his baseline    VITAL SIGNS:  Vital Signs Last 24 Hrs  T(C): 36.8 (17 Apr 2019 10:23), Max: 36.8 (17 Apr 2019 10:23)  T(F): 98.3 (17 Apr 2019 10:23), Max: 98.3 (17 Apr 2019 10:23)  HR: 70 (17 Apr 2019 08:48) (68 - 84)  BP: 128/78 (17 Apr 2019 08:48) (81/53 - 136/66)  BP(mean): 96 (17 Apr 2019 08:48) (89 - 96)  RR: 16 (17 Apr 2019 08:48) (15 - 22)  SpO2: 95% (17 Apr 2019 08:48) (91% - 98%)    PHYSICAL EXAM:    General: morbidly obese adule male laying in bed; NAD  HEENT: NC/AT; PERRL, anicteric sclera; MMM; mallampati 4; small abrasion on bridge of nose - pt states he had this previous to OD  Neck: large neck circumference  Cardiovascular: +S1/S2, RRR  Respiratory: CTA B/L; no W/R/R; small tidal volume  Gastrointestinal: large soft abdomen, NT/ND; +BSx4  Extremities: WWP; no edema, clubbing or cyanosis  Vascular: 2+ radial, DP/PT pulses B/L  Neurological: AAOx3; pt very lethargic and intermittently dozes off and starts snoring; easily arousable.    MEDICATIONS:  MEDICATIONS  (STANDING):  sodium chloride 0.9%. 1000 milliLiter(s) (100 mL/Hr) IV Continuous <Continuous>    MEDICATIONS  (PRN):      ALLERGIES:  Allergies    No Known Allergies    Intolerances        LABS:                        11.3   8.28  )-----------( 153      ( 17 Apr 2019 07:21 )             36.7     04-17    145  |  109<H>  |  14  ----------------------------<  76  4.2   |  26  |  0.74    Ca    8.3<L>      17 Apr 2019 07:21  Mg     2.2     04-17    TPro  8.1  /  Alb  4.3  /  TBili  0.6  /  DBili  x   /  AST  33  /  ALT  57<H>  /  AlkPhos  122<H>  04-16        CAPILLARY BLOOD GLUCOSE          RADIOLOGY & ADDITIONAL TESTS: Reviewed.

## 2019-04-17 NOTE — H&P ADULT - NSHPPHYSICALEXAM_GEN_ALL_CORE
Constitutional: Obese, sleeping on the stretcher; NAD; shivering; easily wakes to voice    Head: NC/AT  Eyes: clear conjunctiva  ENT: no nasal discharge; uvula midline, no oropharyngeal erythema or exudates; MMM  Neck: supple; no JVD or thyromegaly  Respiratory: CTA B/L; no W/R/R, no retractions  Cardiac: +S1/S2; RRR; no M/R/G; PMI non-displaced  Gastrointestinal: soft, NT/ND; no rebound or guarding; +BSx4  Extremities: WWP, no clubbing or cyanosis; no peripheral edema  Musculoskeletal: NROM x4; no joint swelling, tenderness or erythema  Dermatologic: ecchymoses in left AC fossa  Lymphatic: no submandibular or cervical LAD  Neurologic: AAOx3; CNII-XII grossly intact; no focal deficits  Psychiatric: affect and characteristics of appearance, verbalizations, behaviors are appropriate Constitutional: morbidly obese, sleeping on the stretcher; NAD; shivering; easily wakes to voice    Head: NC/AT  Eyes: clear conjunctiva  ENT: no nasal discharge; uvula midline, no oropharyngeal erythema or exudates; MMM, small erythematous abrasion on bridge of nose   Neck: supple; no JVD or thyromegaly  Respiratory: CTA B/L; no W/R/R, no retractions  Cardiac: +S1/S2; RRR; no M/R/G; PMI non-displaced  Gastrointestinal: soft, NT/ND; no rebound or guarding; +BSx4  Extremities: WWP, no clubbing or cyanosis; no peripheral edema  Musculoskeletal: NROM x4; no joint swelling, tenderness or erythema  Dermatologic: ecchymoses in left AC fossa  Lymphatic: no submandibular or cervical LAD  Neurologic: AAOx3; CNII-XII grossly intact; no focal deficits  Psychiatric: affect and characteristics of appearance, verbalizations, behaviors are appropriate

## 2019-04-17 NOTE — H&P ADULT - PROBLEM SELECTOR PLAN 1
Pt presented with heroin overdose, s/p multiple doses of Narcan. CT head negative   - monitor for withdrawal  - continue supportive care   - fall, aspiration precautions Pt presented with heroin overdose, pt found to be very lethargic s/p multiple doses of Narcan. CT head negative. Acetaminophen, salicylate and alcohol levels negative. Utox with THC, cocaine, amphetamines, and opiates,   - monitor for withdrawal  - continue supportive care   - fall, aspiration precautions Pt presented with heroin overdose, pt found to be very lethargic and minimally responsive. S/p 3 doses Narcan in ED. CT head negative. Acetaminophen, salicylate and alcohol levels negative. Utox with THC, cocaine, amphetamines, and opiates,   - monitor for withdrawal  - continue supportive care   - fall, aspiration precautions Pt presented with heroin overdose (injected unknown quantity), pt found to be very lethargic, minimally responsive, hypoxic.  S/p 3 doses Narcan in ED, with mental status improving, still tired but arousable, protecting his airway, no evidence of resp distress, hemodynamically stable. CT head negative. Acetaminophen, salicylate and alcohol levels negative. Utox with THC, cocaine, amphetamines, and opiates,   - monitor for withdrawal  - continue supportive care   - fall, aspiration precautions

## 2019-04-17 NOTE — PROGRESS NOTE ADULT - PROBLEM SELECTOR PLAN 5
- Resolved. Elevated Cr to 1.35 on admission, pre-renal KAREEN 2/2 hypovolemia. Downtrended after NS.  - Continue NS 100cc/hr

## 2019-04-17 NOTE — H&P ADULT - PROBLEM SELECTOR PLAN 2
Pt was found hypoxic with sat 44% by EMS, likely due to respiratory depression from opiate overdose. Now clinically improving on 4L NC .   - continue supplemental oxygen Pt was found hypoxic with sat 44% at the scene by EMS, likely due to respiratory depression from opiate overdose. Was placed on non-rebreather at 100%. Now clinically improving on 4L NC .   - continue supplemental oxygen Pt was found hypoxic with sat 44% at the scene by EMS, likely due to respiratory depression from opiate overdose. Was placed on non-rebreather at 100%. Now clinically improving on 4L NC. However, pt is also morbidly obese, and has been told he needs to be worked up for SO, but has not had a sleep study   - continue supplemental oxygen  - Pulm follow up for official sleep study

## 2019-04-17 NOTE — H&P ADULT - PROBLEM SELECTOR PROBLEM 6
Transition of care performed with sharing of clinical summary Nutrition, metabolism, and development symptoms Hepatitis C

## 2019-04-18 VITALS
SYSTOLIC BLOOD PRESSURE: 119 MMHG | RESPIRATION RATE: 16 BRPM | HEART RATE: 60 BPM | OXYGEN SATURATION: 96 % | TEMPERATURE: 98 F | DIASTOLIC BLOOD PRESSURE: 87 MMHG

## 2019-04-18 LAB
ANION GAP SERPL CALC-SCNC: 9 MMOL/L — SIGNIFICANT CHANGE UP (ref 5–17)
BUN SERPL-MCNC: 13 MG/DL — SIGNIFICANT CHANGE UP (ref 7–23)
CALCIUM SERPL-MCNC: 8.6 MG/DL — SIGNIFICANT CHANGE UP (ref 8.4–10.5)
CHLORIDE SERPL-SCNC: 108 MMOL/L — SIGNIFICANT CHANGE UP (ref 96–108)
CO2 SERPL-SCNC: 28 MMOL/L — SIGNIFICANT CHANGE UP (ref 22–31)
CREAT SERPL-MCNC: 0.69 MG/DL — SIGNIFICANT CHANGE UP (ref 0.5–1.3)
GLUCOSE SERPL-MCNC: 91 MG/DL — SIGNIFICANT CHANGE UP (ref 70–99)
HCT VFR BLD CALC: 38.4 % — LOW (ref 39–50)
HGB BLD-MCNC: 11.8 G/DL — LOW (ref 13–17)
MAGNESIUM SERPL-MCNC: 2.2 MG/DL — SIGNIFICANT CHANGE UP (ref 1.6–2.6)
MCHC RBC-ENTMCNC: 27.7 PG — SIGNIFICANT CHANGE UP (ref 27–34)
MCHC RBC-ENTMCNC: 30.7 GM/DL — LOW (ref 32–36)
MCV RBC AUTO: 90.1 FL — SIGNIFICANT CHANGE UP (ref 80–100)
NRBC # BLD: 0 /100 WBCS — SIGNIFICANT CHANGE UP (ref 0–0)
PLATELET # BLD AUTO: 150 K/UL — SIGNIFICANT CHANGE UP (ref 150–400)
POTASSIUM SERPL-MCNC: 3.6 MMOL/L — SIGNIFICANT CHANGE UP (ref 3.5–5.3)
POTASSIUM SERPL-SCNC: 3.6 MMOL/L — SIGNIFICANT CHANGE UP (ref 3.5–5.3)
RBC # BLD: 4.26 M/UL — SIGNIFICANT CHANGE UP (ref 4.2–5.8)
RBC # FLD: 15.1 % — HIGH (ref 10.3–14.5)
SODIUM SERPL-SCNC: 145 MMOL/L — SIGNIFICANT CHANGE UP (ref 135–145)
WBC # BLD: 7.35 K/UL — SIGNIFICANT CHANGE UP (ref 3.8–10.5)
WBC # FLD AUTO: 7.35 K/UL — SIGNIFICANT CHANGE UP (ref 3.8–10.5)

## 2019-04-18 PROCEDURE — 36415 COLL VENOUS BLD VENIPUNCTURE: CPT

## 2019-04-18 PROCEDURE — 80307 DRUG TEST PRSMV CHEM ANLYZR: CPT

## 2019-04-18 PROCEDURE — 85027 COMPLETE CBC AUTOMATED: CPT

## 2019-04-18 PROCEDURE — 96361 HYDRATE IV INFUSION ADD-ON: CPT

## 2019-04-18 PROCEDURE — 83735 ASSAY OF MAGNESIUM: CPT

## 2019-04-18 PROCEDURE — 96376 TX/PRO/DX INJ SAME DRUG ADON: CPT

## 2019-04-18 PROCEDURE — 94660 CPAP INITIATION&MGMT: CPT

## 2019-04-18 PROCEDURE — 99285 EMERGENCY DEPT VISIT HI MDM: CPT | Mod: 25

## 2019-04-18 PROCEDURE — 80048 BASIC METABOLIC PNL TOTAL CA: CPT

## 2019-04-18 PROCEDURE — 93005 ELECTROCARDIOGRAM TRACING: CPT

## 2019-04-18 PROCEDURE — 71045 X-RAY EXAM CHEST 1 VIEW: CPT

## 2019-04-18 PROCEDURE — 96374 THER/PROPH/DIAG INJ IV PUSH: CPT

## 2019-04-18 PROCEDURE — 85025 COMPLETE CBC W/AUTO DIFF WBC: CPT

## 2019-04-18 PROCEDURE — 70450 CT HEAD/BRAIN W/O DYE: CPT

## 2019-04-18 PROCEDURE — 99239 HOSP IP/OBS DSCHRG MGMT >30: CPT | Mod: GC

## 2019-04-18 PROCEDURE — 80053 COMPREHEN METABOLIC PANEL: CPT

## 2019-04-18 RX ORDER — HEPARIN SODIUM 5000 [USP'U]/ML
7500 INJECTION INTRAVENOUS; SUBCUTANEOUS EVERY 8 HOURS
Qty: 0 | Refills: 0 | Status: DISCONTINUED | OUTPATIENT
Start: 2019-04-18 | End: 2019-04-18

## 2019-04-18 RX ORDER — BUPRENORPHINE AND NALOXONE 2; .5 MG/1; MG/1
1 TABLET SUBLINGUAL
Qty: 0 | Refills: 0 | COMMUNITY

## 2019-04-18 RX ORDER — FLUOXETINE HCL 10 MG
1 CAPSULE ORAL
Qty: 0 | Refills: 0 | COMMUNITY

## 2019-04-18 RX ORDER — OLANZAPINE 15 MG/1
1 TABLET, FILM COATED ORAL
Qty: 0 | Refills: 0 | COMMUNITY

## 2019-04-18 RX ORDER — TRAZODONE HCL 50 MG
1 TABLET ORAL
Qty: 0 | Refills: 0 | COMMUNITY

## 2019-04-18 RX ORDER — POTASSIUM CHLORIDE 20 MEQ
40 PACKET (EA) ORAL ONCE
Qty: 0 | Refills: 0 | Status: COMPLETED | OUTPATIENT
Start: 2019-04-18 | End: 2019-04-18

## 2019-04-18 RX ORDER — PRAZOSIN HCL 2 MG
1 CAPSULE ORAL
Qty: 0 | Refills: 0 | COMMUNITY

## 2019-04-18 RX ADMIN — Medication 40 MILLIEQUIVALENT(S): at 08:48

## 2019-04-18 NOTE — DISCHARGE NOTE NURSING/CASE MANAGEMENT/SOCIAL WORK - NSDCDPATPORTLINK_GEN_ALL_CORE
You can access the [facility], offered by [facility], by registering with the following website: [email or web address]

## 2019-04-25 DIAGNOSIS — E86.1 HYPOVOLEMIA: ICD-10-CM

## 2019-04-25 DIAGNOSIS — I95.2 HYPOTENSION DUE TO DRUGS: ICD-10-CM

## 2019-04-25 DIAGNOSIS — D72.829 ELEVATED WHITE BLOOD CELL COUNT, UNSPECIFIED: ICD-10-CM

## 2019-04-25 DIAGNOSIS — R41.82 ALTERED MENTAL STATUS, UNSPECIFIED: ICD-10-CM

## 2019-04-25 DIAGNOSIS — N17.9 ACUTE KIDNEY FAILURE, UNSPECIFIED: ICD-10-CM

## 2019-04-25 DIAGNOSIS — T40.1X1A POISONING BY HEROIN, ACCIDENTAL (UNINTENTIONAL), INITIAL ENCOUNTER: ICD-10-CM

## 2019-04-25 DIAGNOSIS — F17.210 NICOTINE DEPENDENCE, CIGARETTES, UNCOMPLICATED: ICD-10-CM

## 2019-04-25 DIAGNOSIS — J96.01 ACUTE RESPIRATORY FAILURE WITH HYPOXIA: ICD-10-CM

## 2019-04-25 DIAGNOSIS — F12.10 CANNABIS ABUSE, UNCOMPLICATED: ICD-10-CM

## 2019-04-25 DIAGNOSIS — G92 TOXIC ENCEPHALOPATHY: ICD-10-CM

## 2019-04-25 DIAGNOSIS — E86.0 DEHYDRATION: ICD-10-CM

## 2019-04-25 DIAGNOSIS — G47.33 OBSTRUCTIVE SLEEP APNEA (ADULT) (PEDIATRIC): ICD-10-CM

## 2019-04-25 DIAGNOSIS — E66.01 MORBID (SEVERE) OBESITY DUE TO EXCESS CALORIES: ICD-10-CM

## 2019-04-25 DIAGNOSIS — I45.10 UNSPECIFIED RIGHT BUNDLE-BRANCH BLOCK: ICD-10-CM

## 2019-04-25 DIAGNOSIS — F31.9 BIPOLAR DISORDER, UNSPECIFIED: ICD-10-CM

## 2019-04-25 DIAGNOSIS — B19.20 UNSPECIFIED VIRAL HEPATITIS C WITHOUT HEPATIC COMA: ICD-10-CM

## 2019-04-25 DIAGNOSIS — F15.10 OTHER STIMULANT ABUSE, UNCOMPLICATED: ICD-10-CM

## 2019-04-25 DIAGNOSIS — F11.19 OPIOID ABUSE WITH UNSPECIFIED OPIOID-INDUCED DISORDER: ICD-10-CM

## 2019-04-25 DIAGNOSIS — F14.10 COCAINE ABUSE, UNCOMPLICATED: ICD-10-CM

## 2020-10-28 NOTE — ED ADULT NURSE NOTE - GLASGOW COMA SCALE: EYE OPENING
Nabor notified of Dr Ortez recommendations regarding Warfarin dosing and is in agreement.   (E2) to pain normal (ped)... In no apparent distress, appears well developed and well nourished.

## 2022-04-18 NOTE — ED ADULT TRIAGE NOTE - NS ED TRIAGE AVPU SCALE
Painful - The patient does not respond to voice, but does respond to a painful stimulus, such as a squeeze to the hand or sternal rub. A noxious stimulous is needed to elicit a response.
Pt. refused to participate in interview. Information was taken from  Resident Note./Patient

## 2024-03-16 NOTE — H&P ADULT - NSCORESITESY/N_GEN_A_CORE_RD
CT of abdomen and pelvis without biliary tree dilatation. Possible related congestive hepatopathy. Improving  Monitor LFTs closely   Hold statin for now     No

## 2024-09-10 NOTE — PROGRESS NOTE ADULT - PROBLEM SELECTOR PROBLEM 9
No
Transition of care performed with sharing of clinical summary
Transition of care performed with sharing of clinical summary